# Patient Record
Sex: MALE | Race: AMERICAN INDIAN OR ALASKA NATIVE
[De-identification: names, ages, dates, MRNs, and addresses within clinical notes are randomized per-mention and may not be internally consistent; named-entity substitution may affect disease eponyms.]

---

## 2018-02-11 ENCOUNTER — HOSPITAL ENCOUNTER (EMERGENCY)
Dept: HOSPITAL 31 - C.ER | Age: 33
LOS: 1 days | Discharge: HOME | End: 2018-02-12
Payer: SELF-PAY

## 2018-02-11 VITALS — TEMPERATURE: 98.2 F

## 2018-02-11 DIAGNOSIS — Z87.891: ICD-10-CM

## 2018-02-11 DIAGNOSIS — I10: ICD-10-CM

## 2018-02-11 DIAGNOSIS — H60.90: Primary | ICD-10-CM

## 2018-02-11 DIAGNOSIS — E11.9: ICD-10-CM

## 2018-02-11 DIAGNOSIS — E78.00: ICD-10-CM

## 2018-02-11 LAB
ALBUMIN SERPL-MCNC: 4 G/DL (ref 3.5–5)
ALBUMIN/GLOB SERPL: 1.1 {RATIO} (ref 1–2.1)
ALT SERPL-CCNC: 32 U/L (ref 21–72)
AST SERPL-CCNC: 29 U/L (ref 17–59)
BASOPHILS # BLD AUTO: 0.1 K/UL (ref 0–0.2)
BASOPHILS NFR BLD: 0.5 % (ref 0–2)
BUN SERPL-MCNC: 14 MG/DL (ref 9–20)
CALCIUM SERPL-MCNC: 9.2 MG/DL (ref 8.6–10.4)
EOSINOPHIL # BLD AUTO: 0.2 K/UL (ref 0–0.7)
EOSINOPHIL NFR BLD: 2.2 % (ref 0–4)
ERYTHROCYTE [DISTWIDTH] IN BLOOD BY AUTOMATED COUNT: 14.7 % (ref 11.5–14.5)
GFR NON-AFRICAN AMERICAN: > 60
HGB BLD-MCNC: 16.4 G/DL (ref 12–18)
LYMPHOCYTES # BLD AUTO: 1.8 K/UL (ref 1–4.3)
LYMPHOCYTES NFR BLD AUTO: 16.1 % (ref 20–40)
MCH RBC QN AUTO: 28.8 PG (ref 27–31)
MCHC RBC AUTO-ENTMCNC: 33.5 G/DL (ref 33–37)
MCV RBC AUTO: 86 FL (ref 80–94)
MONOCYTES # BLD: 1.1 K/UL (ref 0–0.8)
MONOCYTES NFR BLD: 9.8 % (ref 0–10)
NEUTROPHILS # BLD: 7.9 K/UL (ref 1.8–7)
NEUTROPHILS NFR BLD AUTO: 71.4 % (ref 50–75)
NRBC BLD AUTO-RTO: 0.3 % (ref 0–2)
PLATELET # BLD: 214 K/UL (ref 130–400)
PMV BLD AUTO: 10.5 FL (ref 7.2–11.7)
RBC # BLD AUTO: 5.7 MIL/UL (ref 4.4–5.9)
WBC # BLD AUTO: 11.1 K/UL (ref 4.8–10.8)

## 2018-02-11 PROCEDURE — 85025 COMPLETE CBC W/AUTO DIFF WBC: CPT

## 2018-02-11 PROCEDURE — 96365 THER/PROPH/DIAG IV INF INIT: CPT

## 2018-02-11 PROCEDURE — 80053 COMPREHEN METABOLIC PANEL: CPT

## 2018-02-11 PROCEDURE — 99284 EMERGENCY DEPT VISIT MOD MDM: CPT

## 2018-02-11 PROCEDURE — 96375 TX/PRO/DX INJ NEW DRUG ADDON: CPT

## 2018-02-11 PROCEDURE — 70450 CT HEAD/BRAIN W/O DYE: CPT

## 2018-02-11 NOTE — CT
EXAM:

  CT Head Without Intravenous Contrast



EXAM DATE/TIME:

  2/11/2018 9:15 PM



CLINICAL HISTORY:

  32 years old, male; Signs and symptoms; Dizziness and other: Left ear ache; 

Additional info: R/O bleed



TECHNIQUE:

  Axial computed tomography images of the head/brain without intravenous 

contrast.  All CT scans at this facility use one or more dose reduction 

techniques, viz.: automated exposure control; ma/kV adjustment per patient size 

(including targeted exams where dose is matched to indication; i.e. head); or 

iterative reconstruction technique.



COMPARISON:

  There are no prior studies for comparison.



FINDINGS:

  Brain:  Ventricles are normal in size and configuration. There is no midline 

shift. There are no intra-axial or extra-axial mass lesions or areas of 

hemorrhage. There are no abnormal fluid collections. Gray-white differentiation 

is maintained.

  Ventricles:  See above.

  Bones: Cranial vault is intact. There is deformity of nasal and facial bones 

suggesting old fractures.

  Soft tissues: There is left facial edema.

  Sinuses:  There is mucoperiosteal thickening in the left maxillary sinus. 

There is partial opacification of left ethmoid air cells.

  Ears and mastoids: Middle ears and mastoids are unremarkable

  Orbits:  Globes are intact.



IMPRESSION:  Old facial bone fractures; sinus disease; no acute intracranial 

abnormality; no acute left middle ear or mastoid disease

## 2018-02-11 NOTE — C.PDOC
History Of Present Illness





Ryan Chauhan is a 32 year old male, whose past medical history includes HTN, 

diabetes, and hypercholesterolemia, who presents to the emergency department 

complaining of discomfort in left ear for one day. Patient reports he has been 

taking Motrin PO. He states waking up with dizziness today and describes it as 

a vertigo sensation. Patient denies chest pain, shortness of breath, headache, 

fever, chills, cough, nausea, vomiting, diarrhea, abdominal pain, or other 

complaints. 


Time Seen by Provider: 18 21:06


Chief Complaint (Nursing): ENT Problem


History Per: Patient


History/Exam Limitations: None


Onset/Duration Of Symptoms: Days


Current Symptoms Are (Timing): Gone


Quality (Ear): Redness, Swelling


Quality (Mouth/Throat): denies: Tenderness, Swelling


Symptoms Have Been: Continuous





Past Medical History


Reviewed: Historical Data, Nursing Documentation, Vital Signs


Vital Signs: 


 Last Vital Signs











Temp  98.2 F   18 22:29


 


Pulse  71   18 22:29


 


Resp  18   18 22:29


 


BP  140/82   18 22:29


 


Pulse Ox  97   18 22:52














- Medical History


PMH: Diabetes, HTN, Hypercholesterolemia


Surgical History: No Surg Hx


Family History: States: Unknown Family Hx





- Social History


Hx Tobacco Use: Yes


Hx Alcohol Use: No


Hx Substance Use: No





- Immunization History


Hx Tetanus Toxoid Vaccination: No


Hx Influenza Vaccination: No


Hx Pneumococcal Vaccination: No





Review Of Systems


Constitutional: Negative for: Fever, Chills


ENT: Positive for: Ear Pain (swelling on left ear)


Cardiovascular: Negative for: Chest Pain


Respiratory: Negative for: Cough, Shortness of Breath


Gastrointestinal: Negative for: Nausea, Vomiting, Abdominal Pain, Diarrhea


Genitourinary: Negative for: Frequency


Neurological: Positive for: Dizziness.  Negative for: Headache





Physical Exam





- Physical Exam


Appears: Well, Non-toxic, No Acute Distress


Skin: Normal Color, Warm, Dry


Head: Atraumatic, Normacephalic


Eye(s): bilateral: Normal Inspection, PERRL, EOMI


Ear(s): Left: TM Erythema, TM Dull (no tympanic membrane visible), Other (

grossly abnormal ), Right: Normal


Oral Mucosa: Moist


Chest: No Tenderness


Cardiovascular: Rhythm Regular


Extremity: Normal ROM


Neurological/Psych: Oriented x3, Normal Speech, Normal Cognition, Normal 

Sensation





ED Course And Treatment





- Laboratory Results


Result Diagrams: 


 18 21:41





 18 21:41


Lab Interpretation: No Acute Changes


O2 Sat by Pulse Oximetry: 97 (room air)


Pulse Ox Interpretation: Normal





- CT Scan/US


  ** CT head


Other Rad Studies (CT/US): Read By Radiologist, Radiology Report Reviewed


CT/US Interpretation: Accession No. : [id number].  Patient Name / ID : 

DARIEN SY  / [id number].  Exam Date : 2018 21:32:27 ( Approved ).  

Study Comment :  Sex / Age : M  / 32Y.  Creator : NORBERTO DURON.  

Dictator :   :  Approver : NORBERTO DURON.  Approver2 :  

Report Date : 2018 22:11:00.  My Comment :  ******************************

*****************************************************.  Formerly [facility].  

[facility] Division of Radiology.  [address] [place] [address].  Tel. no. [phone].  .  .  Patient Name: RYAN CHAUHAN            

Account #: [id number].  Pt. Address: [address] [place] [address]. Rec #

: [id number].  [place] [address]            Ordering Dr: Gricelda EVANS,Kalli Chavez.  Pt Phone: [phone]                             Order Location: 

[facility]  : 1985 Male Age: 32            Order #: [id number].  Accession #

: [id number].  Reason for exam: R/O Bleed.  .  .  .  .  .  CT Scan.  .  .  

HEAD W/O CONTRAST                              Exam Date: 18.  .  This 

imaging exam was performed at [facility].  EXAM:  CT Head Without 

Intravenous Contrast.  .  EXAM DATE/TIME:  2018 9:15 PM.  .  CLINICAL 

HISTORY:  32 years old, male; Signs and symptoms; Dizziness and other: Left ear 

ache;.  Additional info: R/O bleed.  .  TECHNIQUE:  Axial computed tomography 

images of the head/brain without intravenous.  contrast.  All CT scans at this 

facility use one or more dose reduction.  techniques, viz.: automated exposure 

control; ma/kV adjustment per patient size.  (including targeted exams where 

dose is matched to indication; i.e. head); or.  iterative reconstruction 

technique.  .  COMPARISON:  There are no prior studies for comparison.  .  

FINDINGS:  Brain:  Ventricles are normal in size and configuration. There is no 

midline.  shift. There are no intra-axial or extra-axial mass lesions or areas 

of.  hemorrhage. There are no abnormal fluid collections. Gray-white 

differentiation.  is maintained.  Ventricles:  See above.  Bones: Cranial vault 

is intact. There is deformity of nasal and facial bones.  suggesting old 

fractures.  Soft tissues: There is left facial edema.  Sinuses:  There is 

mucoperiosteal thickening in the left maxillary sinus.  There is partial 

opacification of left ethmoid air cells.  Ears and mastoids: Middle ears and 

mastoids are unremarkable.  Orbits:  Globes are intact.  .  IMPRESSION:  Old 

facial bone fractures; sinus disease; no acute intracranial.  abnormality; no 

acute left middle ear or mastoid disease.  .  Dictated By:  Norberto Martinez MD., MD.  Dictated Date/Time:  18.  Signed By:  Norberto Cosme MD.  Date Signed: 18.  Transcribed [facility]: [facility].  Transcribe 

Date/Time: 18.  FREDI/MT


Progress Note: Patient treated in ED with IV Cipro and Morphine and PO 

Meclizine.





Medical Decision Making


Medical Decision Making: 





Impression:


 


33 y/o male with no tympanic membrane visible on left ear and grossly abnormal





Plan:


-- CT Head


-- Morphine and Antivert


-- Reassess and disposition





Disposition


Counseled Patient/Family Regarding: Studies Performed, Diagnosis, Need For 

Followup, Rx Given





- Disposition


Referrals: 


Behin,Babak, MD [Staff Provider] - 


Disposition: HOME/ ROUTINE


Disposition Time: 23:43


Condition: STABLE


Prescriptions: 


Ciprofloxacin [Cipro] 1 tab PO BID #20 tab


Instructions:  Otitis Externa (ED)


Forms:  [facility] (English)





- Clinical Impression


Clinical Impression: 


 Otitis externa








- Scribe Statement


The provider has reviewed the documentation as recorded by the Sanyae








 Meghanibe Attestation:





Nay Carlisle Attestation:





All medical record entries made by the Meghanibe were at my direction and 

personally dictated by me. I have reviewed the chart and agree that the record 

accurately reflects my personal performance of the history, physical exam, 

medical decision making, and the department course for this patient. I have 

also personally directed, reviewed, and agree with the discharge instructions 

and disposition.

## 2018-02-12 VITALS
HEART RATE: 68 BPM | SYSTOLIC BLOOD PRESSURE: 150 MMHG | DIASTOLIC BLOOD PRESSURE: 100 MMHG | RESPIRATION RATE: 20 BRPM | OXYGEN SATURATION: 98 %